# Patient Record
Sex: FEMALE | Race: WHITE | NOT HISPANIC OR LATINO
[De-identification: names, ages, dates, MRNs, and addresses within clinical notes are randomized per-mention and may not be internally consistent; named-entity substitution may affect disease eponyms.]

---

## 2020-04-06 ENCOUNTER — APPOINTMENT (OUTPATIENT)
Dept: DISASTER EMERGENCY | Facility: CLINIC | Age: 50
End: 2020-04-06
Payer: COMMERCIAL

## 2020-04-06 VITALS — OXYGEN SATURATION: 98 % | HEART RATE: 77 BPM | TEMPERATURE: 98.3 F

## 2020-04-06 DIAGNOSIS — R68.89 OTHER GENERAL SYMPTOMS AND SIGNS: ICD-10-CM

## 2020-04-06 DIAGNOSIS — Z20.828 CONTACT WITH AND (SUSPECTED) EXPOSURE TO OTHER VIRAL COMMUNICABLE DISEASES: ICD-10-CM

## 2020-04-06 PROBLEM — Z00.00 ENCOUNTER FOR PREVENTIVE HEALTH EXAMINATION: Status: ACTIVE | Noted: 2020-04-06

## 2020-04-06 PROCEDURE — 99202 OFFICE O/P NEW SF 15 MIN: CPT

## 2020-04-06 NOTE — HISTORY OF PRESENT ILLNESS
[Patient presents to the office today for COVID-19 evaluation and testing.] : Patient presents to the office today for COVID-19 evaluation and testing. [Patient has been pre-screened by RN at call center for appointment today with our facility.] : Patient has been pre-screened by RN at call center for appointment today with our facility. [] : no dizziness on standing [With Confirmed Case] : patient exposed to a confirmed case of COVID-19 [Lung Disease] : lung disease [None] : none [Clear] : clear [Good Air Entry] : good air entry [Speaks in full sentences] : speaks in full sentences [Normal O2 sat at rest] : normal O2 sat at rest [Grossly normal, interacts, not tired or weak] : grossly normal, interacts, not tired or weak [COVID-19 testing ordered and specimen obtained] : COVID-19 testing ordered and specimen obtained [Discharged with current Quarantine instructions and advised of signs of worsening illness.] : Patient discharged with current quarantine instructions and advised of signs of worsening illness. Patient told to seek emergent care if symptoms occur. [TextBox_97] : 98

## 2020-04-06 NOTE — ADDENDUM
[FreeTextEntry1] : covid swab\par I stated that testing results may take between 5-7 days to become available. The lab will contact the patient with the results. If the test is positive advised   Roxanne     to continue home isolation until they they are completely well with no fever and it has been at least 14 days since last positive test. If the test is negative. They will be able to stop home isolation and resume standard precautions. Referred further questions to [phone]\par \par Recommended precautionary steps from now until 14 days from when they returned from travel date or from last known possible contact: Do not go to work, school or public areas. Avoid using public transportation, airports, taxis and ride sharing. As much as possible separate themselves from other people at home. Wear supplied mask when ever they are around other people. Reschedule non-urgent medical appointments to another date. Wash hands with soap and water for at least 20 seconds or use an alcohol based  that contains 60-95% alcohol. covering all surfaces until dry. Cover mouth and nose with tissue when they cough or sneeze, throw tissue in trans and wash hands. Avoid touching eyes, mouth and nose. with hands. Avoid sharing personal items such as dishes, drinking glasses, cups, eating utensils, towels and bedding with other people. Clean and disinfect all high touch surfaces.\par \par Advised   Roxanne    of signs of decompensation s to be watched for at home and seek immediate medical care if they occur such as: chest pain, severe shortness of breath. worsening shortness of breath,with minimal exertion, new or worsening wheezing, dizziness on standing or generally feeling worse. \par \par  Roxanne  Voiced understanding.\par \par \par

## 2020-04-08 LAB — SARS-COV-2 N GENE NPH QL NAA+PROBE: NOT DETECTED

## 2023-07-03 ENCOUNTER — OFFICE (OUTPATIENT)
Dept: URBAN - METROPOLITAN AREA CLINIC 12 | Facility: CLINIC | Age: 53
Setting detail: OPHTHALMOLOGY
End: 2023-07-03
Payer: COMMERCIAL

## 2023-07-03 VITALS — HEIGHT: 55 IN

## 2023-07-03 DIAGNOSIS — H02.721: ICD-10-CM

## 2023-07-03 DIAGNOSIS — H16.223: ICD-10-CM

## 2023-07-03 PROCEDURE — 99213 OFFICE O/P EST LOW 20 MIN: CPT | Performed by: OPTOMETRIST

## 2023-07-03 ASSESSMENT — SUPERFICIAL PUNCTATE KERATITIS (SPK)
OD_SPK: T
OS_SPK: T

## 2023-07-03 ASSESSMENT — REFRACTION_AUTOREFRACTION
OD_SPHERE: -0.75
OS_CYLINDER: -1.25
OD_AXIS: 107
OS_AXIS: 069
OD_CYLINDER: -1.25
OS_SPHERE: -0.75

## 2023-07-03 ASSESSMENT — CONFRONTATIONAL VISUAL FIELD TEST (CVF)
OS_FINDINGS: FULL
OD_FINDINGS: FULL

## 2023-07-03 ASSESSMENT — KERATOMETRY
OS_K1POWER_DIOPTERS: 43.75
OS_AXISANGLE_DEGREES: 163
OD_AXISANGLE_DEGREES: 024
OS_K2POWER_DIOPTERS: 44.25
OD_K1POWER_DIOPTERS: 43.50
OD_K2POWER_DIOPTERS: 44.50

## 2023-07-03 ASSESSMENT — SPHEQUIV_DERIVED
OS_SPHEQUIV: -1.375
OD_SPHEQUIV: -1.375

## 2023-07-03 ASSESSMENT — LID EXAM ASSESSMENTS
OD_BLEPHARITIS: RUL 2+
OS_BLEPHARITIS: LUL 2+

## 2023-07-03 ASSESSMENT — LID POSITION - COMMENTS
OD_COMMENTS: INCISIONS INTACT
OS_COMMENTS: INCISIONS INTACT

## 2023-07-03 ASSESSMENT — AXIALLENGTH_DERIVED
OD_AL: 23.9493
OS_AL: 23.9493

## 2023-07-03 ASSESSMENT — VISUAL ACUITY
OS_BCVA: 20/20
OD_BCVA: 20/20

## 2023-07-17 ENCOUNTER — RX ONLY (RX ONLY)
Age: 53
End: 2023-07-17

## 2023-07-17 ENCOUNTER — OFFICE (OUTPATIENT)
Dept: URBAN - METROPOLITAN AREA CLINIC 100 | Facility: CLINIC | Age: 53
Setting detail: OPHTHALMOLOGY
End: 2023-07-17
Payer: COMMERCIAL

## 2023-07-17 DIAGNOSIS — H02.831: ICD-10-CM

## 2023-07-17 DIAGNOSIS — H16.223: ICD-10-CM

## 2023-07-17 DIAGNOSIS — H02.721: ICD-10-CM

## 2023-07-17 DIAGNOSIS — H02.834: ICD-10-CM

## 2023-07-17 DIAGNOSIS — D49.2: ICD-10-CM

## 2023-07-17 PROCEDURE — 92285 EXTERNAL OCULAR PHOTOGRAPHY: CPT | Performed by: OPHTHALMOLOGY

## 2023-07-17 PROCEDURE — 67840 REMOVE EYELID LESION: CPT | Performed by: OPHTHALMOLOGY

## 2023-07-17 PROCEDURE — 92012 INTRM OPH EXAM EST PATIENT: CPT | Performed by: OPHTHALMOLOGY

## 2023-07-17 ASSESSMENT — AXIALLENGTH_DERIVED
OD_AL: 23.9493
OS_AL: 23.9493

## 2023-07-17 ASSESSMENT — REFRACTION_AUTOREFRACTION
OD_SPHERE: -0.75
OD_AXIS: 107
OS_AXIS: 069
OS_SPHERE: -0.75
OD_CYLINDER: -1.25
OS_CYLINDER: -1.25

## 2023-07-17 ASSESSMENT — CONFRONTATIONAL VISUAL FIELD TEST (CVF)
OD_FINDINGS: FULL
OS_FINDINGS: FULL

## 2023-07-17 ASSESSMENT — VISUAL ACUITY
OS_BCVA: 20/20
OD_BCVA: 20/20-1

## 2023-07-17 ASSESSMENT — SUPERFICIAL PUNCTATE KERATITIS (SPK)
OS_SPK: T
OD_SPK: T

## 2023-07-17 ASSESSMENT — KERATOMETRY
OS_K2POWER_DIOPTERS: 44.25
OD_K2POWER_DIOPTERS: 44.50
OD_K1POWER_DIOPTERS: 43.50
OD_AXISANGLE_DEGREES: 024
OS_K1POWER_DIOPTERS: 43.75
OS_AXISANGLE_DEGREES: 163

## 2023-07-17 ASSESSMENT — LID POSITION - COMMENTS
OD_COMMENTS: INCISIONS INTACT
OS_COMMENTS: INCISIONS INTACT

## 2023-07-17 ASSESSMENT — SPHEQUIV_DERIVED
OS_SPHEQUIV: -1.375
OD_SPHEQUIV: -1.375

## 2023-07-17 ASSESSMENT — LID EXAM ASSESSMENTS
OD_BLEPHARITIS: RUL 2+
OS_BLEPHARITIS: LUL 2+

## 2023-07-17 ASSESSMENT — TONOMETRY
OD_IOP_MMHG: 13
OS_IOP_MMHG: 12

## 2023-07-31 ENCOUNTER — OFFICE (OUTPATIENT)
Dept: URBAN - METROPOLITAN AREA CLINIC 100 | Facility: CLINIC | Age: 53
Setting detail: OPHTHALMOLOGY
End: 2023-07-31
Payer: COMMERCIAL

## 2023-07-31 DIAGNOSIS — D49.2: ICD-10-CM

## 2023-07-31 DIAGNOSIS — H16.223: ICD-10-CM

## 2023-07-31 DIAGNOSIS — H02.834: ICD-10-CM

## 2023-07-31 DIAGNOSIS — H02.721: ICD-10-CM

## 2023-07-31 DIAGNOSIS — H02.831: ICD-10-CM

## 2023-07-31 PROCEDURE — 92012 INTRM OPH EXAM EST PATIENT: CPT | Performed by: OPHTHALMOLOGY

## 2023-07-31 ASSESSMENT — REFRACTION_AUTOREFRACTION
OS_CYLINDER: -1.25
OD_CYLINDER: -1.25
OD_SPHERE: -0.75
OS_AXIS: 069
OS_SPHERE: -0.75
OD_AXIS: 107

## 2023-07-31 ASSESSMENT — CONFRONTATIONAL VISUAL FIELD TEST (CVF)
OS_FINDINGS: FULL
OD_FINDINGS: FULL

## 2023-07-31 ASSESSMENT — LID POSITION - COMMENTS
OD_COMMENTS: INCISIONS INTACT
OS_COMMENTS: INCISIONS INTACT

## 2023-07-31 ASSESSMENT — VISUAL ACUITY
OD_BCVA: 20/20-1
OS_BCVA: 20/20

## 2023-07-31 ASSESSMENT — SPHEQUIV_DERIVED
OD_SPHEQUIV: -1.375
OS_SPHEQUIV: -1.375

## 2023-07-31 ASSESSMENT — LID EXAM ASSESSMENTS
OD_BLEPHARITIS: RUL 2+
OS_BLEPHARITIS: LUL 2+

## 2023-07-31 ASSESSMENT — SUPERFICIAL PUNCTATE KERATITIS (SPK)
OS_SPK: T
OD_SPK: T

## 2023-12-11 ENCOUNTER — OFFICE (OUTPATIENT)
Dept: URBAN - METROPOLITAN AREA CLINIC 100 | Facility: CLINIC | Age: 53
Setting detail: OPHTHALMOLOGY
End: 2023-12-11
Payer: COMMERCIAL

## 2023-12-11 VITALS — HEIGHT: 55 IN

## 2023-12-11 DIAGNOSIS — D49.2: ICD-10-CM

## 2023-12-11 DIAGNOSIS — H02.834: ICD-10-CM

## 2023-12-11 DIAGNOSIS — H02.831: ICD-10-CM

## 2023-12-11 DIAGNOSIS — H16.223: ICD-10-CM

## 2023-12-11 PROCEDURE — 92285 EXTERNAL OCULAR PHOTOGRAPHY: CPT | Performed by: OPHTHALMOLOGY

## 2023-12-11 PROCEDURE — 92012 INTRM OPH EXAM EST PATIENT: CPT | Performed by: OPHTHALMOLOGY

## 2023-12-11 ASSESSMENT — REFRACTION_AUTOREFRACTION
OD_AXIS: 107
OD_SPHERE: -0.75
OS_CYLINDER: -1.25
OS_SPHERE: -0.75
OS_AXIS: 069
OD_CYLINDER: -1.25

## 2023-12-11 ASSESSMENT — SPHEQUIV_DERIVED
OD_SPHEQUIV: -1.375
OS_SPHEQUIV: -1.375

## 2023-12-11 ASSESSMENT — SUPERFICIAL PUNCTATE KERATITIS (SPK)
OD_SPK: T
OS_SPK: T

## 2023-12-11 ASSESSMENT — LID POSITION - COMMENTS
OD_COMMENTS: INCISIONS INTACT
OS_COMMENTS: INCISIONS INTACT

## 2023-12-11 ASSESSMENT — LID EXAM ASSESSMENTS
OS_BLEPHARITIS: LUL 2+
OD_BLEPHARITIS: RUL 2+

## 2023-12-11 ASSESSMENT — CONFRONTATIONAL VISUAL FIELD TEST (CVF)
OD_FINDINGS: FULL
OS_FINDINGS: FULL

## 2024-01-08 ENCOUNTER — OFFICE (OUTPATIENT)
Dept: URBAN - METROPOLITAN AREA CLINIC 100 | Facility: CLINIC | Age: 54
Setting detail: OPHTHALMOLOGY
End: 2024-01-08
Payer: COMMERCIAL

## 2024-01-08 DIAGNOSIS — H02.834: ICD-10-CM

## 2024-01-08 DIAGNOSIS — D49.2: ICD-10-CM

## 2024-01-08 DIAGNOSIS — H16.223: ICD-10-CM

## 2024-01-08 DIAGNOSIS — H02.721: ICD-10-CM

## 2024-01-08 DIAGNOSIS — H02.831: ICD-10-CM

## 2024-01-08 PROCEDURE — 92012 INTRM OPH EXAM EST PATIENT: CPT | Performed by: OPHTHALMOLOGY

## 2024-01-08 ASSESSMENT — LID EXAM ASSESSMENTS
OD_BLEPHARITIS: RUL 2+
OS_BLEPHARITIS: LUL 2+
OD_COMMENTS: LID EVERTED

## 2024-01-08 ASSESSMENT — CONFRONTATIONAL VISUAL FIELD TEST (CVF)
OD_FINDINGS: FULL
OS_FINDINGS: FULL

## 2024-01-08 ASSESSMENT — LID POSITION - COMMENTS
OD_COMMENTS: INCISIONS INTACT
OS_COMMENTS: INCISIONS INTACT

## 2024-01-08 ASSESSMENT — SPHEQUIV_DERIVED
OS_SPHEQUIV: -1.375
OD_SPHEQUIV: -1.375

## 2024-01-08 ASSESSMENT — REFRACTION_AUTOREFRACTION
OS_AXIS: 069
OS_SPHERE: -0.75
OD_AXIS: 107
OS_CYLINDER: -1.25
OD_CYLINDER: -1.25
OD_SPHERE: -0.75

## 2024-01-08 ASSESSMENT — SUPERFICIAL PUNCTATE KERATITIS (SPK)
OD_SPK: T
OS_SPK: T

## 2024-02-10 ENCOUNTER — OFFICE (OUTPATIENT)
Dept: URBAN - METROPOLITAN AREA CLINIC 35 | Facility: CLINIC | Age: 54
Setting detail: OPHTHALMOLOGY
End: 2024-02-10
Payer: COMMERCIAL

## 2024-02-10 DIAGNOSIS — H02.834: ICD-10-CM

## 2024-02-10 DIAGNOSIS — H00.14: ICD-10-CM

## 2024-02-10 DIAGNOSIS — H02.831: ICD-10-CM

## 2024-02-10 DIAGNOSIS — H16.223: ICD-10-CM

## 2024-02-10 DIAGNOSIS — H02.721: ICD-10-CM

## 2024-02-10 DIAGNOSIS — D49.2: ICD-10-CM

## 2024-02-10 DIAGNOSIS — H01.001: ICD-10-CM

## 2024-02-10 DIAGNOSIS — H01.004: ICD-10-CM

## 2024-02-10 PROCEDURE — 92285 EXTERNAL OCULAR PHOTOGRAPHY: CPT | Performed by: OPHTHALMOLOGY

## 2024-02-10 PROCEDURE — 92012 INTRM OPH EXAM EST PATIENT: CPT | Performed by: OPHTHALMOLOGY

## 2024-02-10 ASSESSMENT — LID EXAM ASSESSMENTS
OS_COMMENTS: LID EVERTED
OS_BLEPHARITIS: LUL 2+
OD_BLEPHARITIS: RUL 2+

## 2024-02-10 ASSESSMENT — REFRACTION_AUTOREFRACTION
OS_SPHERE: -0.75
OD_CYLINDER: -1.25
OD_SPHERE: -0.75
OS_CYLINDER: -1.25
OS_AXIS: 069
OD_AXIS: 107

## 2024-02-10 ASSESSMENT — LID POSITION - COMMENTS
OS_COMMENTS: INCISIONS INTACT
OD_COMMENTS: INCISIONS INTACT

## 2024-02-10 ASSESSMENT — SPHEQUIV_DERIVED
OS_SPHEQUIV: -1.375
OD_SPHEQUIV: -1.375

## 2024-02-10 ASSESSMENT — SUPERFICIAL PUNCTATE KERATITIS (SPK)
OS_SPK: T
OD_SPK: T

## 2024-02-10 ASSESSMENT — CONFRONTATIONAL VISUAL FIELD TEST (CVF)
OD_FINDINGS: FULL
OS_FINDINGS: FULL

## 2024-02-16 ENCOUNTER — OFFICE (OUTPATIENT)
Dept: URBAN - METROPOLITAN AREA CLINIC 12 | Facility: CLINIC | Age: 54
Setting detail: OPHTHALMOLOGY
End: 2024-02-16
Payer: COMMERCIAL

## 2024-02-16 DIAGNOSIS — L03.213: ICD-10-CM

## 2024-02-16 DIAGNOSIS — H00.14: ICD-10-CM

## 2024-02-16 PROCEDURE — 99213 OFFICE O/P EST LOW 20 MIN: CPT | Performed by: OPTOMETRIST

## 2024-02-16 ASSESSMENT — CONFRONTATIONAL VISUAL FIELD TEST (CVF)
OD_FINDINGS: FULL
OS_FINDINGS: FULL

## 2024-02-16 ASSESSMENT — LID POSITION - COMMENTS
OS_COMMENTS: INCISIONS INTACT
OD_COMMENTS: INCISIONS INTACT

## 2024-02-16 ASSESSMENT — SPHEQUIV_DERIVED
OS_SPHEQUIV: -1.375
OD_SPHEQUIV: -1.375

## 2024-02-16 ASSESSMENT — REFRACTION_AUTOREFRACTION
OS_AXIS: 069
OS_CYLINDER: -1.25
OD_AXIS: 107
OS_SPHERE: -0.75
OD_CYLINDER: -1.25
OD_SPHERE: -0.75

## 2024-02-16 ASSESSMENT — LID EXAM ASSESSMENTS
OS_EDEMA: LUL
OD_BLEPHARITIS: RUL 2+
OS_BLEPHARITIS: LUL 2+

## 2024-02-16 ASSESSMENT — SUPERFICIAL PUNCTATE KERATITIS (SPK)
OD_SPK: T
OS_SPK: T

## 2024-02-19 ENCOUNTER — RX ONLY (RX ONLY)
Age: 54
End: 2024-02-19

## 2024-02-19 ENCOUNTER — OFFICE (OUTPATIENT)
Dept: URBAN - METROPOLITAN AREA CLINIC 6 | Facility: CLINIC | Age: 54
Setting detail: OPHTHALMOLOGY
End: 2024-02-19
Payer: COMMERCIAL

## 2024-02-19 DIAGNOSIS — H01.005: ICD-10-CM

## 2024-02-19 DIAGNOSIS — H00.14: ICD-10-CM

## 2024-02-19 DIAGNOSIS — H01.001: ICD-10-CM

## 2024-02-19 DIAGNOSIS — H01.004: ICD-10-CM

## 2024-02-19 DIAGNOSIS — H01.002: ICD-10-CM

## 2024-02-19 PROCEDURE — 99213 OFFICE O/P EST LOW 20 MIN: CPT | Performed by: OPHTHALMOLOGY

## 2024-02-19 ASSESSMENT — SUPERFICIAL PUNCTATE KERATITIS (SPK)
OS_SPK: T
OD_SPK: T

## 2024-02-19 ASSESSMENT — REFRACTION_AUTOREFRACTION
OD_SPHERE: -0.75
OS_SPHERE: -0.75
OS_CYLINDER: -1.25
OD_CYLINDER: -1.25
OD_AXIS: 107
OS_AXIS: 069

## 2024-02-19 ASSESSMENT — LID EXAM ASSESSMENTS
OS_BLEPHARITIS: LLL LUL 2+
OD_BLEPHARITIS: RLL RUL 2+
OS_EDEMA: LUL

## 2024-02-19 ASSESSMENT — CONFRONTATIONAL VISUAL FIELD TEST (CVF)
OS_FINDINGS: FULL
OD_FINDINGS: FULL

## 2024-02-19 ASSESSMENT — SPHEQUIV_DERIVED
OD_SPHEQUIV: -1.375
OS_SPHEQUIV: -1.375

## 2024-02-24 ENCOUNTER — OFFICE (OUTPATIENT)
Dept: URBAN - METROPOLITAN AREA CLINIC 100 | Facility: CLINIC | Age: 54
Setting detail: OPHTHALMOLOGY
End: 2024-02-24
Payer: COMMERCIAL

## 2024-02-24 DIAGNOSIS — H01.001: ICD-10-CM

## 2024-02-24 DIAGNOSIS — H01.004: ICD-10-CM

## 2024-02-24 DIAGNOSIS — L71.0: ICD-10-CM

## 2024-02-24 DIAGNOSIS — H00.14: ICD-10-CM

## 2024-02-24 PROCEDURE — 92012 INTRM OPH EXAM EST PATIENT: CPT | Performed by: OPHTHALMOLOGY

## 2024-02-24 ASSESSMENT — SPHEQUIV_DERIVED
OD_SPHEQUIV: -1.375
OS_SPHEQUIV: -1.375

## 2024-02-24 ASSESSMENT — CONFRONTATIONAL VISUAL FIELD TEST (CVF)
OD_FINDINGS: FULL
OS_FINDINGS: FULL

## 2024-02-24 ASSESSMENT — SUPERFICIAL PUNCTATE KERATITIS (SPK)
OD_SPK: T
OS_SPK: T

## 2024-02-24 ASSESSMENT — REFRACTION_AUTOREFRACTION
OD_AXIS: 107
OS_CYLINDER: -1.25
OD_SPHERE: -0.75
OS_AXIS: 069
OS_SPHERE: -0.75
OD_CYLINDER: -1.25

## 2024-02-24 ASSESSMENT — LID EXAM ASSESSMENTS
OS_BLEPHARITIS: LLL LUL 2+
OD_BLEPHARITIS: RLL RUL 2+

## 2024-11-07 ENCOUNTER — OFFICE (OUTPATIENT)
Dept: URBAN - METROPOLITAN AREA CLINIC 100 | Facility: CLINIC | Age: 54
Setting detail: OPHTHALMOLOGY
End: 2024-11-07
Payer: COMMERCIAL

## 2024-11-07 ENCOUNTER — RX ONLY (RX ONLY)
Age: 54
End: 2024-11-07

## 2024-11-07 DIAGNOSIS — H01.005: ICD-10-CM

## 2024-11-07 DIAGNOSIS — H01.001: ICD-10-CM

## 2024-11-07 DIAGNOSIS — D49.2: ICD-10-CM

## 2024-11-07 DIAGNOSIS — H02.721: ICD-10-CM

## 2024-11-07 DIAGNOSIS — L71.0: ICD-10-CM

## 2024-11-07 DIAGNOSIS — H01.004: ICD-10-CM

## 2024-11-07 DIAGNOSIS — H01.002: ICD-10-CM

## 2024-11-07 PROCEDURE — 92012 INTRM OPH EXAM EST PATIENT: CPT | Performed by: OPHTHALMOLOGY

## 2024-11-07 ASSESSMENT — KERATOMETRY
OS_K1POWER_DIOPTERS: 43.75
OD_K1POWER_DIOPTERS: 43.50
OS_AXISANGLE_DEGREES: 163
OD_AXISANGLE_DEGREES: 024
OD_K2POWER_DIOPTERS: 44.50
OS_K2POWER_DIOPTERS: 44.25

## 2024-11-07 ASSESSMENT — SUPERFICIAL PUNCTATE KERATITIS (SPK)
OS_SPK: T
OD_SPK: T

## 2024-11-07 ASSESSMENT — REFRACTION_AUTOREFRACTION
OS_SPHERE: -0.75
OD_AXIS: 107
OD_SPHERE: -0.75
OS_AXIS: 069
OD_CYLINDER: -1.25
OS_CYLINDER: -1.25

## 2024-11-07 ASSESSMENT — VISUAL ACUITY
OS_BCVA: 20/25
OD_BCVA: 20/25

## 2024-11-07 ASSESSMENT — LID EXAM ASSESSMENTS
OS_BLEPHARITIS: LLL LUL 2+
OD_BLEPHARITIS: RLL RUL 2+

## 2024-11-07 ASSESSMENT — CONFRONTATIONAL VISUAL FIELD TEST (CVF)
OD_FINDINGS: FULL
OS_FINDINGS: FULL

## 2024-11-25 ENCOUNTER — OFFICE (OUTPATIENT)
Dept: URBAN - METROPOLITAN AREA CLINIC 100 | Facility: CLINIC | Age: 54
Setting detail: OPHTHALMOLOGY
End: 2024-11-25
Payer: COMMERCIAL

## 2024-11-25 DIAGNOSIS — H01.002: ICD-10-CM

## 2024-11-25 DIAGNOSIS — D49.2: ICD-10-CM

## 2024-11-25 DIAGNOSIS — H01.004: ICD-10-CM

## 2024-11-25 DIAGNOSIS — L71.0: ICD-10-CM

## 2024-11-25 DIAGNOSIS — H01.001: ICD-10-CM

## 2024-11-25 DIAGNOSIS — H01.005: ICD-10-CM

## 2024-11-25 DIAGNOSIS — H02.721: ICD-10-CM

## 2024-11-25 PROCEDURE — 92012 INTRM OPH EXAM EST PATIENT: CPT | Performed by: OPHTHALMOLOGY

## 2024-11-25 ASSESSMENT — REFRACTION_AUTOREFRACTION
OS_SPHERE: -0.75
OD_SPHERE: -0.75
OS_AXIS: 069
OS_CYLINDER: -1.25
OD_AXIS: 107
OD_CYLINDER: -1.25

## 2024-11-25 ASSESSMENT — LID EXAM ASSESSMENTS
OD_BLEPHARITIS: RLL RUL 2+
OS_BLEPHARITIS: LLL LUL 2+

## 2024-11-25 ASSESSMENT — KERATOMETRY
OS_AXISANGLE_DEGREES: 163
OS_K2POWER_DIOPTERS: 44.25
OD_K1POWER_DIOPTERS: 43.50
OD_K2POWER_DIOPTERS: 44.50
OS_K1POWER_DIOPTERS: 43.75
OD_AXISANGLE_DEGREES: 024

## 2024-11-25 ASSESSMENT — SUPERFICIAL PUNCTATE KERATITIS (SPK)
OS_SPK: T
OD_SPK: T

## 2024-11-25 ASSESSMENT — VISUAL ACUITY
OD_BCVA: 20/20
OS_BCVA: 20/20

## 2024-11-25 ASSESSMENT — CONFRONTATIONAL VISUAL FIELD TEST (CVF)
OS_FINDINGS: FULL
OD_FINDINGS: FULL

## 2024-11-25 ASSESSMENT — TONOMETRY
OD_IOP_MMHG: 12
OS_IOP_MMHG: 14

## 2025-02-26 ENCOUNTER — APPOINTMENT (OUTPATIENT)
Dept: ORTHOPEDIC SURGERY | Facility: CLINIC | Age: 55
End: 2025-02-26
Payer: COMMERCIAL

## 2025-02-26 ENCOUNTER — NON-APPOINTMENT (OUTPATIENT)
Age: 55
End: 2025-02-26

## 2025-02-26 DIAGNOSIS — M77.11 LATERAL EPICONDYLITIS, RIGHT ELBOW: ICD-10-CM

## 2025-02-26 PROCEDURE — 73080 X-RAY EXAM OF ELBOW: CPT | Mod: 26,RT

## 2025-02-26 PROCEDURE — 20551 NJX 1 TENDON ORIGIN/INSJ: CPT | Mod: RT

## 2025-02-26 PROCEDURE — 76942 ECHO GUIDE FOR BIOPSY: CPT

## 2025-02-26 PROCEDURE — 99204 OFFICE O/P NEW MOD 45 MIN: CPT | Mod: 25

## 2025-08-11 ENCOUNTER — APPOINTMENT (OUTPATIENT)
Dept: ORTHOPEDIC SURGERY | Facility: CLINIC | Age: 55
End: 2025-08-11
Payer: COMMERCIAL

## 2025-08-11 VITALS — WEIGHT: 160 LBS | BODY MASS INDEX: 25.71 KG/M2 | HEIGHT: 66 IN

## 2025-08-11 DIAGNOSIS — M77.11 LATERAL EPICONDYLITIS, RIGHT ELBOW: ICD-10-CM

## 2025-08-11 PROCEDURE — 99203 OFFICE O/P NEW LOW 30 MIN: CPT | Mod: 25

## 2025-08-11 PROCEDURE — 20551 NJX 1 TENDON ORIGIN/INSJ: CPT

## 2025-08-12 RX ORDER — MELOXICAM 7.5 MG/1
7.5 TABLET ORAL
Qty: 20 | Refills: 0 | Status: ACTIVE | COMMUNITY
Start: 2025-08-12 | End: 1900-01-01

## 2025-08-15 ENCOUNTER — NON-APPOINTMENT (OUTPATIENT)
Age: 55
End: 2025-08-15

## 2025-08-15 ENCOUNTER — APPOINTMENT (OUTPATIENT)
Dept: OTOLARYNGOLOGY | Facility: CLINIC | Age: 55
End: 2025-08-15
Payer: COMMERCIAL

## 2025-08-15 VITALS — SYSTOLIC BLOOD PRESSURE: 136 MMHG | HEART RATE: 64 BPM | DIASTOLIC BLOOD PRESSURE: 76 MMHG

## 2025-08-15 DIAGNOSIS — J34.89 OTHER SPECIFIED DISORDERS OF NOSE AND NASAL SINUSES: ICD-10-CM

## 2025-08-15 DIAGNOSIS — K21.9 GASTRO-ESOPHAGEAL REFLUX DISEASE W/OUT ESOPHAGITIS: ICD-10-CM

## 2025-08-15 DIAGNOSIS — R09.82 POSTNASAL DRIP: ICD-10-CM

## 2025-08-15 DIAGNOSIS — R13.10 DYSPHAGIA, UNSPECIFIED: ICD-10-CM

## 2025-08-15 PROCEDURE — 99204 OFFICE O/P NEW MOD 45 MIN: CPT | Mod: 25

## 2025-08-15 PROCEDURE — 31231 NASAL ENDOSCOPY DX: CPT

## 2025-08-15 RX ORDER — DICLOFENAC SODIUM 10 MG/G
1 GEL TOPICAL
Refills: 0 | Status: ACTIVE | COMMUNITY

## 2025-08-15 RX ORDER — ESOMEPRAZOLE MAGNESIUM 40 MG/1
40 CAPSULE, DELAYED RELEASE ORAL
Qty: 30 | Refills: 0 | Status: ACTIVE | COMMUNITY
Start: 2025-08-15 | End: 1900-01-01

## 2025-08-15 RX ORDER — DOXYCYCLINE 100 MG/1
100 CAPSULE ORAL
Qty: 20 | Refills: 0 | Status: ACTIVE | COMMUNITY
Start: 2025-08-15 | End: 1900-01-01

## 2025-08-15 RX ORDER — AMILORIDE HYDROCHLORIDE 5 MG/1
TABLET ORAL
Refills: 0 | Status: ACTIVE | COMMUNITY

## 2025-08-15 RX ORDER — LORATADINE 5 MG/5 ML
0.05 SOLUTION, ORAL ORAL
Qty: 1 | Refills: 0 | Status: ACTIVE | COMMUNITY
Start: 2025-08-15 | End: 1900-01-01

## 2025-08-15 RX ORDER — METHYLPREDNISOLONE 4 MG/1
4 TABLET ORAL
Qty: 1 | Refills: 0 | Status: ACTIVE | COMMUNITY
Start: 2025-08-15 | End: 1900-01-01

## 2025-08-15 RX ORDER — FLUTICASONE PROPIONATE 50 UG/1
50 SPRAY NASAL DAILY
Qty: 1 | Refills: 2 | Status: ACTIVE | COMMUNITY
Start: 2025-08-15 | End: 1900-01-01

## 2025-08-15 RX ORDER — DICLOFENAC SODIUM 10 MG/G
1 GEL TOPICAL DAILY
Qty: 1 | Refills: 1 | Status: COMPLETED | COMMUNITY
Start: 2025-08-12 | End: 2025-08-15

## 2025-08-15 RX ORDER — ELASTIC BANDAGE 1"X2.2YD
2300-700 BANDAGE TOPICAL
Qty: 1 | Refills: 0 | Status: ACTIVE | COMMUNITY
Start: 2025-08-15 | End: 1900-01-01